# Patient Record
Sex: MALE | URBAN - METROPOLITAN AREA
[De-identification: names, ages, dates, MRNs, and addresses within clinical notes are randomized per-mention and may not be internally consistent; named-entity substitution may affect disease eponyms.]

---

## 2024-08-19 ENCOUNTER — NURSE TRIAGE (OUTPATIENT)
Dept: NURSING | Facility: CLINIC | Age: 29
End: 2024-08-19

## 2024-08-20 NOTE — TELEPHONE ENCOUNTER
"Bilateral ear pain.  Has been going on for a while.  He's unsure if the abx helped.  Sharp pain that increases with noise  Had chills today. Not sure if he has a fever.  Has a sort of dizzy sensation sometimes when he moves his head.  Per the protocol, I recommended he be seen within four hours.  Caller stated understanding and agreement.    He'll go to the [facility], now.       Reason for Disposition   Moving the earlobe or touching the ear clearly increases the pain   Redness or swelling of outer ear (ear lobe)    Additional Information   Negative: Recently examined and diagnosed with \"Otitis Externa\" or \"Swimmer's Ear\"   Negative: [1] Stiff neck (can't touch chin to chest) AND [2] fever   Negative: [1] Stiff neck (can't touch chin to chest) AND [2] headache   Negative: Bony area of skull behind the ear is pink or swollen   Negative: Patient sounds very sick or weak to the triager   Negative: [1] SEVERE pain and [2] not improved 2 hours after analgesic medication (e.g., ibuprofen or acetaminophen)   Commented on: Fever > 100.4 F (38.0 C)     unsure    Protocols used: Earache-A-AH, Ear - Swimmer's-A-AH  Audrey MIKE RN [facility]     "